# Patient Record
(demographics unavailable — no encounter records)

---

## 2025-04-30 NOTE — SIGNATURES
[TextEntry] : This note was written by Chely aFlcon on 04/30/2025 actively solely SARAH Waldrop M.D 04/30/2025 . All medical record entries made by this scribe were at my  SARAH Waldrop M.D  direction and personally dictated by me on 04/30/2025. I have personally reviewed my chart and agree that the record reflects my personal performance of the history, physical exam, assessment, and plan.

## 2025-04-30 NOTE — PHYSICAL EXAM
[MA] : MA [FreeTextEntry2] : Vonnie Veliz [Appropriately responsive] : appropriately responsive [Alert] : alert [No Acute Distress] : no acute distress [No Lymphadenopathy] : no lymphadenopathy [Soft] : soft [Non-tender] : non-tender [Non-distended] : non-distended [No HSM] : No HSM [No Lesions] : no lesions [No Mass] : no mass [Oriented x3] : oriented x3 [Examination Of The Breasts] : a normal appearance [No Discharge] : no discharge [No Masses] : no breast masses were palpable [Labia Majora] : normal [Labia Minora] : normal [Normal] : normal [Uterine Adnexae] : normal

## 2025-04-30 NOTE — HISTORY OF PRESENT ILLNESS
[FreeTextEntry1] : 73 year old P1 postmenopausal pt presents for annual gyn visit. Pt is doing well and has no complaints. She denies PMB and abnormal discharge. Offers no complaints regarding bowel movement or urination. [N] : Patient is not sexually active [Mammogramdate] : 11/21 [PapSmeardate] : 4/23 [BoneDensityDate] : 09/15 [Post-Menopause, No Sxs] : post-menopausal, currently without symptoms [Menopause Age: ____] : age at menopause was [unfilled]

## 2025-04-30 NOTE — PLAN
[FreeTextEntry1] : 73 year old postmenopausal presents for annual gyn visit.   Referral for mammo/sono given RTO prn